# Patient Record
Sex: FEMALE | Race: WHITE | NOT HISPANIC OR LATINO | URBAN - METROPOLITAN AREA
[De-identification: names, ages, dates, MRNs, and addresses within clinical notes are randomized per-mention and may not be internally consistent; named-entity substitution may affect disease eponyms.]

---

## 2018-07-06 ENCOUNTER — OUTPATIENT (OUTPATIENT)
Dept: OUTPATIENT SERVICES | Facility: HOSPITAL | Age: 38
LOS: 1 days | End: 2018-07-06
Payer: COMMERCIAL

## 2018-07-06 DIAGNOSIS — O47.1 FALSE LABOR AT OR AFTER 37 COMPLETED WEEKS OF GESTATION: ICD-10-CM

## 2018-07-06 PROCEDURE — 59025 FETAL NON-STRESS TEST: CPT

## 2018-07-06 PROCEDURE — G0463: CPT

## 2018-07-06 NOTE — CONSULT NOTE ADULT - SUBJECTIVE AND OBJECTIVE BOX
Pt has an IUP of 37 weeks duration. She reports fluid per vagina today. Denies bleeding or abd pain. Pt determined not to have ROM. No fluid seen in vagina and amnisure was negative. Pt D/C to home. She will F/U with Dr. Buchanan or PRN. NST baseline 150/min, reactive without decelerations, without contractions

## 2018-07-13 ENCOUNTER — OUTPATIENT (OUTPATIENT)
Dept: OUTPATIENT SERVICES | Facility: HOSPITAL | Age: 38
LOS: 1 days | End: 2018-07-13
Payer: COMMERCIAL

## 2018-07-13 DIAGNOSIS — Z01.818 ENCOUNTER FOR OTHER PREPROCEDURAL EXAMINATION: ICD-10-CM

## 2018-07-13 LAB
ALBUMIN SERPL ELPH-MCNC: 3.6 G/DL — SIGNIFICANT CHANGE UP (ref 3.3–5.2)
ALP SERPL-CCNC: 82 U/L — SIGNIFICANT CHANGE UP (ref 40–120)
ALT FLD-CCNC: 21 U/L — SIGNIFICANT CHANGE UP
ANION GAP SERPL CALC-SCNC: 15 MMOL/L — SIGNIFICANT CHANGE UP (ref 5–17)
APTT BLD: 25.6 SEC — LOW (ref 27.5–37.4)
AST SERPL-CCNC: 24 U/L — SIGNIFICANT CHANGE UP
BILIRUB SERPL-MCNC: 0.2 MG/DL — LOW (ref 0.4–2)
BLD GP AB SCN SERPL QL: SIGNIFICANT CHANGE UP
BUN SERPL-MCNC: 7 MG/DL — LOW (ref 8–20)
CALCIUM SERPL-MCNC: 9.6 MG/DL — SIGNIFICANT CHANGE UP (ref 8.6–10.2)
CHLORIDE SERPL-SCNC: 102 MMOL/L — SIGNIFICANT CHANGE UP (ref 98–107)
CO2 SERPL-SCNC: 22 MMOL/L — SIGNIFICANT CHANGE UP (ref 22–29)
CREAT SERPL-MCNC: 0.43 MG/DL — LOW (ref 0.5–1.3)
GLUCOSE SERPL-MCNC: 81 MG/DL — SIGNIFICANT CHANGE UP (ref 70–115)
HCT VFR BLD CALC: 37.2 % — SIGNIFICANT CHANGE UP (ref 37–47)
HGB BLD-MCNC: 12.1 G/DL — SIGNIFICANT CHANGE UP (ref 12–16)
HIV 1 & 2 AB SERPL IA.RAPID: SIGNIFICANT CHANGE UP
INR BLD: 0.84 RATIO — LOW (ref 0.88–1.16)
MCHC RBC-ENTMCNC: 29 PG — SIGNIFICANT CHANGE UP (ref 27–31)
MCHC RBC-ENTMCNC: 32.5 G/DL — SIGNIFICANT CHANGE UP (ref 32–36)
MCV RBC AUTO: 89.2 FL — SIGNIFICANT CHANGE UP (ref 81–99)
PLATELET # BLD AUTO: 236 K/UL — SIGNIFICANT CHANGE UP (ref 150–400)
POTASSIUM SERPL-MCNC: 4.4 MMOL/L — SIGNIFICANT CHANGE UP (ref 3.5–5.3)
POTASSIUM SERPL-SCNC: 4.4 MMOL/L — SIGNIFICANT CHANGE UP (ref 3.5–5.3)
PROT SERPL-MCNC: 7 G/DL — SIGNIFICANT CHANGE UP (ref 6.6–8.7)
PROTHROM AB SERPL-ACNC: 9.2 SEC — LOW (ref 9.8–12.7)
RBC # BLD: 4.17 M/UL — LOW (ref 4.4–5.2)
RBC # FLD: 13.4 % — SIGNIFICANT CHANGE UP (ref 11–15.6)
SODIUM SERPL-SCNC: 139 MMOL/L — SIGNIFICANT CHANGE UP (ref 135–145)
TYPE + AB SCN PNL BLD: SIGNIFICANT CHANGE UP
WBC # BLD: 8 K/UL — SIGNIFICANT CHANGE UP (ref 4.8–10.8)
WBC # FLD AUTO: 8 K/UL — SIGNIFICANT CHANGE UP (ref 4.8–10.8)

## 2018-07-13 PROCEDURE — 86850 RBC ANTIBODY SCREEN: CPT

## 2018-07-13 PROCEDURE — 85610 PROTHROMBIN TIME: CPT

## 2018-07-13 PROCEDURE — 86901 BLOOD TYPING SEROLOGIC RH(D): CPT

## 2018-07-13 PROCEDURE — 86900 BLOOD TYPING SEROLOGIC ABO: CPT

## 2018-07-13 PROCEDURE — 86703 HIV-1/HIV-2 1 RESULT ANTBDY: CPT

## 2018-07-13 PROCEDURE — 36415 COLL VENOUS BLD VENIPUNCTURE: CPT

## 2018-07-13 PROCEDURE — 85027 COMPLETE CBC AUTOMATED: CPT

## 2018-07-13 PROCEDURE — 80053 COMPREHEN METABOLIC PANEL: CPT

## 2018-07-13 PROCEDURE — 85730 THROMBOPLASTIN TIME PARTIAL: CPT

## 2018-07-20 ENCOUNTER — INPATIENT (INPATIENT)
Facility: HOSPITAL | Age: 38
LOS: 1 days | Discharge: HOME | End: 2018-07-22
Payer: COMMERCIAL

## 2018-07-20 VITALS
HEART RATE: 99 BPM | OXYGEN SATURATION: 100 % | RESPIRATION RATE: 18 BRPM | SYSTOLIC BLOOD PRESSURE: 111 MMHG | DIASTOLIC BLOOD PRESSURE: 79 MMHG | TEMPERATURE: 98 F

## 2018-07-20 DIAGNOSIS — O47.1 FALSE LABOR AT OR AFTER 37 COMPLETED WEEKS OF GESTATION: ICD-10-CM

## 2018-07-20 LAB
BASE EXCESS BLDCOA CALC-SCNC: -0.5 MMOL/L — SIGNIFICANT CHANGE UP (ref -2–2)
BASE EXCESS BLDCOV CALC-SCNC: -0.5 MMOL/L — SIGNIFICANT CHANGE UP (ref -2–2)
BLD GP AB SCN SERPL QL: SIGNIFICANT CHANGE UP
GAS PNL BLDCOV: 7.36 — SIGNIFICANT CHANGE UP (ref 7.25–7.45)
HCO3 BLDCOA-SCNC: 22 MMOL/L — SIGNIFICANT CHANGE UP (ref 21–29)
HCO3 BLDCOV-SCNC: 23 MMOL/L — SIGNIFICANT CHANGE UP (ref 21–29)
PCO2 BLDCOA: 48.1 MMHG — SIGNIFICANT CHANGE UP (ref 32–68)
PCO2 BLDCOV: 44.9 MMHG — SIGNIFICANT CHANGE UP (ref 29–53)
PH BLDCOA: 7.34 — SIGNIFICANT CHANGE UP (ref 7.18–7.38)
PO2 BLDCOA: 16 MMHG — SIGNIFICANT CHANGE UP (ref 5.7–30.5)
PO2 BLDCOA: 22.6 MMHG — SIGNIFICANT CHANGE UP (ref 17–41)
SAO2 % BLDCOA: SIGNIFICANT CHANGE UP
SAO2 % BLDCOV: SIGNIFICANT CHANGE UP
TYPE + AB SCN PNL BLD: SIGNIFICANT CHANGE UP

## 2018-07-20 RX ORDER — LANOLIN
1 OINTMENT (GRAM) TOPICAL
Qty: 0 | Refills: 0 | Status: DISCONTINUED | OUTPATIENT
Start: 2018-07-20 | End: 2018-07-22

## 2018-07-20 RX ORDER — DOCUSATE SODIUM 100 MG
100 CAPSULE ORAL
Qty: 0 | Refills: 0 | Status: DISCONTINUED | OUTPATIENT
Start: 2018-07-20 | End: 2018-07-22

## 2018-07-20 RX ORDER — ACETAMINOPHEN 500 MG
650 TABLET ORAL EVERY 6 HOURS
Qty: 0 | Refills: 0 | Status: DISCONTINUED | OUTPATIENT
Start: 2018-07-20 | End: 2018-07-22

## 2018-07-20 RX ORDER — SODIUM CHLORIDE 9 MG/ML
1000 INJECTION, SOLUTION INTRAVENOUS
Qty: 0 | Refills: 0 | Status: DISCONTINUED | OUTPATIENT
Start: 2018-07-20 | End: 2018-07-22

## 2018-07-20 RX ORDER — NALOXONE HYDROCHLORIDE 4 MG/.1ML
0.1 SPRAY NASAL
Qty: 0 | Refills: 0 | Status: DISCONTINUED | OUTPATIENT
Start: 2018-07-20 | End: 2018-07-22

## 2018-07-20 RX ORDER — SODIUM CHLORIDE 9 MG/ML
1000 INJECTION, SOLUTION INTRAVENOUS
Qty: 0 | Refills: 0 | Status: DISCONTINUED | OUTPATIENT
Start: 2018-07-20 | End: 2018-07-20

## 2018-07-20 RX ORDER — OXYTOCIN 10 UNIT/ML
333.33 VIAL (ML) INJECTION
Qty: 20 | Refills: 0 | Status: COMPLETED | OUTPATIENT
Start: 2018-07-20

## 2018-07-20 RX ORDER — SODIUM CHLORIDE 9 MG/ML
1000 INJECTION, SOLUTION INTRAVENOUS ONCE
Qty: 0 | Refills: 0 | Status: COMPLETED | OUTPATIENT
Start: 2018-07-20 | End: 2018-07-20

## 2018-07-20 RX ORDER — ACETAMINOPHEN 500 MG
1000 TABLET ORAL ONCE
Qty: 0 | Refills: 0 | Status: DISCONTINUED | OUTPATIENT
Start: 2018-07-20 | End: 2018-07-22

## 2018-07-20 RX ORDER — SIMETHICONE 80 MG/1
80 TABLET, CHEWABLE ORAL EVERY 4 HOURS
Qty: 0 | Refills: 0 | Status: DISCONTINUED | OUTPATIENT
Start: 2018-07-20 | End: 2018-07-22

## 2018-07-20 RX ORDER — OXYCODONE AND ACETAMINOPHEN 5; 325 MG/1; MG/1
2 TABLET ORAL EVERY 6 HOURS
Qty: 0 | Refills: 0 | Status: DISCONTINUED | OUTPATIENT
Start: 2018-07-20 | End: 2018-07-22

## 2018-07-20 RX ORDER — DIPHENHYDRAMINE HCL 50 MG
50 CAPSULE ORAL EVERY 4 HOURS
Qty: 0 | Refills: 0 | Status: DISCONTINUED | OUTPATIENT
Start: 2018-07-20 | End: 2018-07-22

## 2018-07-20 RX ORDER — IBUPROFEN 200 MG
600 TABLET ORAL EVERY 6 HOURS
Qty: 0 | Refills: 0 | Status: DISCONTINUED | OUTPATIENT
Start: 2018-07-20 | End: 2018-07-22

## 2018-07-20 RX ORDER — OXYTOCIN 10 UNIT/ML
41.67 VIAL (ML) INJECTION
Qty: 20 | Refills: 0 | Status: DISCONTINUED | OUTPATIENT
Start: 2018-07-20 | End: 2018-07-22

## 2018-07-20 RX ORDER — OXYCODONE HYDROCHLORIDE 5 MG/1
5 TABLET ORAL
Qty: 0 | Refills: 0 | Status: DISCONTINUED | OUTPATIENT
Start: 2018-07-20 | End: 2018-07-22

## 2018-07-20 RX ORDER — OXYTOCIN 10 UNIT/ML
333.33 VIAL (ML) INJECTION
Qty: 20 | Refills: 0 | Status: COMPLETED | OUTPATIENT
Start: 2018-07-20 | End: 2018-07-20

## 2018-07-20 RX ORDER — ONDANSETRON 8 MG/1
4 TABLET, FILM COATED ORAL EVERY 6 HOURS
Qty: 0 | Refills: 0 | Status: DISCONTINUED | OUTPATIENT
Start: 2018-07-20 | End: 2018-07-22

## 2018-07-20 RX ORDER — OXYCODONE AND ACETAMINOPHEN 5; 325 MG/1; MG/1
1 TABLET ORAL
Qty: 0 | Refills: 0 | Status: DISCONTINUED | OUTPATIENT
Start: 2018-07-20 | End: 2018-07-22

## 2018-07-20 RX ORDER — CEFAZOLIN SODIUM 1 G
2000 VIAL (EA) INJECTION ONCE
Qty: 0 | Refills: 0 | Status: COMPLETED | OUTPATIENT
Start: 2018-07-20 | End: 2018-07-20

## 2018-07-20 RX ORDER — KETOROLAC TROMETHAMINE 30 MG/ML
30 SYRINGE (ML) INJECTION EVERY 6 HOURS
Qty: 0 | Refills: 0 | Status: DISCONTINUED | OUTPATIENT
Start: 2018-07-20 | End: 2018-07-21

## 2018-07-20 RX ORDER — OXYCODONE HYDROCHLORIDE 5 MG/1
10 TABLET ORAL
Qty: 0 | Refills: 0 | Status: DISCONTINUED | OUTPATIENT
Start: 2018-07-20 | End: 2018-07-22

## 2018-07-20 RX ORDER — TETANUS TOXOID, REDUCED DIPHTHERIA TOXOID AND ACELLULAR PERTUSSIS VACCINE, ADSORBED 5; 2.5; 8; 8; 2.5 [IU]/.5ML; [IU]/.5ML; UG/.5ML; UG/.5ML; UG/.5ML
0.5 SUSPENSION INTRAMUSCULAR ONCE
Qty: 0 | Refills: 0 | Status: DISCONTINUED | OUTPATIENT
Start: 2018-07-20 | End: 2018-07-22

## 2018-07-20 RX ORDER — CEFAZOLIN SODIUM 1 G
2000 VIAL (EA) INJECTION EVERY 8 HOURS
Qty: 0 | Refills: 0 | Status: COMPLETED | OUTPATIENT
Start: 2018-07-21 | End: 2018-07-21

## 2018-07-20 RX ORDER — KETOROLAC TROMETHAMINE 30 MG/ML
30 SYRINGE (ML) INJECTION ONCE
Qty: 0 | Refills: 0 | Status: DISCONTINUED | OUTPATIENT
Start: 2018-07-20 | End: 2018-07-22

## 2018-07-20 RX ORDER — GLYCERIN ADULT
1 SUPPOSITORY, RECTAL RECTAL AT BEDTIME
Qty: 0 | Refills: 0 | Status: DISCONTINUED | OUTPATIENT
Start: 2018-07-20 | End: 2018-07-22

## 2018-07-20 RX ORDER — SODIUM CHLORIDE 9 MG/ML
1500 INJECTION, SOLUTION INTRAVENOUS ONCE
Qty: 0 | Refills: 0 | Status: DISCONTINUED | OUTPATIENT
Start: 2018-07-20 | End: 2018-07-20

## 2018-07-20 RX ORDER — OXYTOCIN 10 UNIT/ML
333.33 VIAL (ML) INJECTION
Qty: 20 | Refills: 0 | Status: DISCONTINUED | OUTPATIENT
Start: 2018-07-20 | End: 2018-07-22

## 2018-07-20 RX ORDER — DIPHENHYDRAMINE HCL 50 MG
25 CAPSULE ORAL EVERY 6 HOURS
Qty: 0 | Refills: 0 | Status: DISCONTINUED | OUTPATIENT
Start: 2018-07-20 | End: 2018-07-22

## 2018-07-20 RX ORDER — CITRIC ACID/SODIUM CITRATE 300-500 MG
30 SOLUTION, ORAL ORAL ONCE
Qty: 0 | Refills: 0 | Status: DISCONTINUED | OUTPATIENT
Start: 2018-07-20 | End: 2018-07-20

## 2018-07-20 RX ORDER — FERROUS SULFATE 325(65) MG
325 TABLET ORAL DAILY
Qty: 0 | Refills: 0 | Status: DISCONTINUED | OUTPATIENT
Start: 2018-07-20 | End: 2018-07-22

## 2018-07-20 RX ADMIN — Medication 100 MILLIGRAM(S): at 17:00

## 2018-07-20 RX ADMIN — SODIUM CHLORIDE 125 MILLILITER(S): 9 INJECTION, SOLUTION INTRAVENOUS at 17:44

## 2018-07-20 RX ADMIN — SODIUM CHLORIDE 1000 MILLILITER(S): 9 INJECTION, SOLUTION INTRAVENOUS at 15:05

## 2018-07-20 RX ADMIN — Medication 1000 MILLIUNIT(S)/MIN: at 19:01

## 2018-07-21 LAB
BASOPHILS # BLD AUTO: 0 K/UL — SIGNIFICANT CHANGE UP (ref 0–0.2)
BASOPHILS NFR BLD AUTO: 0.2 % — SIGNIFICANT CHANGE UP (ref 0–2)
EOSINOPHIL # BLD AUTO: 0.1 K/UL — SIGNIFICANT CHANGE UP (ref 0–0.5)
EOSINOPHIL NFR BLD AUTO: 0.6 % — SIGNIFICANT CHANGE UP (ref 0–6)
HCT VFR BLD CALC: 35.1 % — LOW (ref 37–47)
HGB BLD-MCNC: 11.3 G/DL — LOW (ref 12–16)
LYMPHOCYTES # BLD AUTO: 1.8 K/UL — SIGNIFICANT CHANGE UP (ref 1–4.8)
LYMPHOCYTES # BLD AUTO: 16.3 % — LOW (ref 20–55)
MCHC RBC-ENTMCNC: 28.5 PG — SIGNIFICANT CHANGE UP (ref 27–31)
MCHC RBC-ENTMCNC: 32.2 G/DL — SIGNIFICANT CHANGE UP (ref 32–36)
MCV RBC AUTO: 88.4 FL — SIGNIFICANT CHANGE UP (ref 81–99)
MONOCYTES # BLD AUTO: 0.7 K/UL — SIGNIFICANT CHANGE UP (ref 0–0.8)
MONOCYTES NFR BLD AUTO: 6.3 % — SIGNIFICANT CHANGE UP (ref 3–10)
NEUTROPHILS # BLD AUTO: 8.5 K/UL — HIGH (ref 1.8–8)
NEUTROPHILS NFR BLD AUTO: 75.7 % — HIGH (ref 37–73)
PLATELET # BLD AUTO: 232 K/UL — SIGNIFICANT CHANGE UP (ref 150–400)
RBC # BLD: 3.97 M/UL — LOW (ref 4.4–5.2)
RBC # FLD: 13.7 % — SIGNIFICANT CHANGE UP (ref 11–15.6)
WBC # BLD: 11.2 K/UL — HIGH (ref 4.8–10.8)
WBC # FLD AUTO: 11.2 K/UL — HIGH (ref 4.8–10.8)

## 2018-07-21 RX ADMIN — Medication 600 MILLIGRAM(S): at 21:46

## 2018-07-21 RX ADMIN — Medication 1 TABLET(S): at 15:22

## 2018-07-21 RX ADMIN — Medication 30 MILLIGRAM(S): at 09:28

## 2018-07-21 RX ADMIN — Medication 30 MILLIGRAM(S): at 10:25

## 2018-07-21 RX ADMIN — Medication 30 MILLIGRAM(S): at 15:22

## 2018-07-21 RX ADMIN — Medication 100 MILLIGRAM(S): at 03:39

## 2018-07-21 RX ADMIN — SODIUM CHLORIDE 125 MILLILITER(S): 9 INJECTION, SOLUTION INTRAVENOUS at 03:40

## 2018-07-21 RX ADMIN — Medication 100 MILLIGRAM(S): at 10:39

## 2018-07-21 RX ADMIN — Medication 30 MILLIGRAM(S): at 15:37

## 2018-07-21 RX ADMIN — Medication 100 MILLIGRAM(S): at 18:03

## 2018-07-21 RX ADMIN — Medication 30 MILLIGRAM(S): at 03:28

## 2018-07-21 RX ADMIN — Medication 30 MILLIGRAM(S): at 03:43

## 2018-07-21 RX ADMIN — Medication 600 MILLIGRAM(S): at 22:46

## 2018-07-21 RX ADMIN — Medication 325 MILLIGRAM(S): at 15:22

## 2018-07-21 RX ADMIN — SIMETHICONE 80 MILLIGRAM(S): 80 TABLET, CHEWABLE ORAL at 18:03

## 2018-07-21 NOTE — PROGRESS NOTE ADULT - ASSESSMENT
Patient is a 36yo  now POD#1 s/p repeat  section      Stable  -catheter removed at 6:30, did not void yet,   -tolerating PO, bowel sounds +  -Advance care as tolerated   -Continue routine postpartum/postoperative care and education.

## 2018-07-22 VITALS
TEMPERATURE: 98 F | DIASTOLIC BLOOD PRESSURE: 84 MMHG | SYSTOLIC BLOOD PRESSURE: 122 MMHG | HEART RATE: 90 BPM | RESPIRATION RATE: 16 BRPM

## 2018-07-22 LAB — T PALLIDUM AB TITR SER: NEGATIVE — SIGNIFICANT CHANGE UP

## 2018-07-22 RX ORDER — TETANUS TOXOID, REDUCED DIPHTHERIA TOXOID AND ACELLULAR PERTUSSIS VACCINE, ADSORBED 5; 2.5; 8; 8; 2.5 [IU]/.5ML; [IU]/.5ML; UG/.5ML; UG/.5ML; UG/.5ML
0.5 SUSPENSION INTRAMUSCULAR ONCE
Qty: 0 | Refills: 0 | Status: COMPLETED | OUTPATIENT
Start: 2018-07-22 | End: 2018-07-22

## 2018-07-22 RX ORDER — IBUPROFEN 200 MG
1 TABLET ORAL
Qty: 0 | Refills: 0 | COMMUNITY
Start: 2018-07-22

## 2018-07-22 RX ORDER — TETANUS TOXOID, REDUCED DIPHTHERIA TOXOID AND ACELLULAR PERTUSSIS VACCINE, ADSORBED 5; 2.5; 8; 8; 2.5 [IU]/.5ML; [IU]/.5ML; UG/.5ML; UG/.5ML; UG/.5ML
0.5 SUSPENSION INTRAMUSCULAR ONCE
Qty: 0 | Refills: 0 | Status: DISCONTINUED | OUTPATIENT
Start: 2018-07-22 | End: 2018-07-22

## 2018-07-22 RX ADMIN — Medication 600 MILLIGRAM(S): at 04:27

## 2018-07-22 RX ADMIN — Medication 600 MILLIGRAM(S): at 10:51

## 2018-07-22 RX ADMIN — Medication 325 MILLIGRAM(S): at 10:49

## 2018-07-22 RX ADMIN — Medication 100 MILLIGRAM(S): at 02:21

## 2018-07-22 RX ADMIN — TETANUS TOXOID, REDUCED DIPHTHERIA TOXOID AND ACELLULAR PERTUSSIS VACCINE, ADSORBED 0.5 MILLILITER(S): 5; 2.5; 8; 8; 2.5 SUSPENSION INTRAMUSCULAR at 10:50

## 2018-07-22 RX ADMIN — SIMETHICONE 80 MILLIGRAM(S): 80 TABLET, CHEWABLE ORAL at 02:21

## 2018-07-22 RX ADMIN — Medication 600 MILLIGRAM(S): at 05:27

## 2018-07-22 RX ADMIN — Medication 1 TABLET(S): at 10:50

## 2018-07-22 NOTE — PROGRESS NOTE ADULT - SUBJECTIVE AND OBJECTIVE BOX
INTERVAL HPI/OVERNIGHT EVENTS:  37y Female s/p c section under spinal anesthesia with duramorph for post op analgesia on 7/20/18    Vital Signs Last 24 Hrs  T(C): 36.5 (21 Jul 2018 16:30), Max: 36.7 (21 Jul 2018 00:05)  T(F): 97.7 (21 Jul 2018 16:30), Max: 98.1 (21 Jul 2018 00:05)  HR: 99 (21 Jul 2018 16:30) (73 - 99)  BP: 132/70 (21 Jul 2018 16:30) (111/70 - 132/70)  BP(mean): --  RR: 16 (21 Jul 2018 16:30) (16 - 18)  SpO2: --    Patient seen, doing well, no anesthetic complications or complaints noted or reported.  Pain is controlled.
Patient is a 36yo  now POD#1 s/p repeat  section      S:    The patient has no complaints. Pain controlled with medication   She is ambulating, and tolerating PO.   + flatus/-BM     O:    T(C): 36.7 (18 @ 08:06), Max: 36.8 (18 @ 18:11)  HR: 89 (18 @ 08:06) (73 - 99)  BP: 113/79 (18 @ 08:06) (95/54 - 130/84)  RR: 16 (18 @ 08:06) (15 - 20)  SpO2: 99% (18 @ 20:10) (99% - 100%)    Breast: nontender, not engorged   Abdomen:  soft, appropriately tender, mildly distended, +bowel sounds.  Uterus:  Fundus firm below umbilicus  Incision:  Clean/dry/intact, the dressing was removed.  VE:  +lochia  Ext:  Non-tender, no edema                           11.3   11.2  )-----------( 232      ( 2018 06:59 )             35.1
Patient is a 37y s/p  post-operative day 2.    Subjective:  No acute events overnight. The patient is feeling well.   She is tolerating a diet and denies N/V.    Patient is having normal postpartum bleeding which is decreasing in amount.    She is breastfeeding and the baby is latching on.    Urinating appropriately.   -BM/+flatus.    Physical exam:    Vital Signs Last 24 Hrs  T(C): 36.6 (2018 23:55), Max: 36.7 (2018 08:06)  T(F): 97.9 (2018 23:55), Max: 98 (2018 08:06)  HR: 92 (2018 23:55) (89 - 99)  BP: 113/79 (2018 23:55) (113/79 - 132/70)  RR: 18 (2018 23:55) (16 - 18)      Heart: RRR  Lungs: CTABL  Breast: non tender, not engorged   Abdomen: Soft, nontender, no distension, firm uterine fundus, the incision is clean dry and intact  Ext: No DVT signs, warm extremities    LABS:                        11.3   11.2  )-----------( 232      ( 2018 06:59 )             35.1
Subjective:  The patient feels well.  She is ambulating without difficulty.  She is tolerating PO.  She is voiding.  She denies nausea and vomiting.  Her pain is controlled.  She reports normal postpartum bleeding      Physical exam:    Vital Signs Last 24 Hrs  T(C): 36.6 (21 Jul 2018 23:55), Max: 36.7 (21 Jul 2018 08:06)  T(F): 97.9 (21 Jul 2018 23:55), Max: 98 (21 Jul 2018 08:06)  HR: 92 (21 Jul 2018 23:55) (89 - 99)  BP: 113/79 (21 Jul 2018 23:55) (113/79 - 132/70)  BP(mean): --  RR: 18 (21 Jul 2018 23:55) (16 - 18)  SpO2: --    Gen: NAD   Breast feeding  Abdomen: Soft, nontender, no distension , firm uterine fundus at umbilicus.  Incision: Clean, dry and staples intact  Passing flatus and positive bowel movement  Pelvic: Normal lochia noted  Ext: No calf tenderness    LABS:                        11.3   11.2  )-----------( 232      ( 21 Jul 2018 06:59 )             35.1     blood type    A/P PO # 2   Doing well.  Patient requesting discharge if baby is okay to go home.  Patient will take Motrim for pain.  Follow up in office 1 wk   All questions answered.
Subjective:  The patient feels well.  She is ambulating without difficulty.  She is tolerating PO.  She is voiding.  She denies nausea and vomiting.  Her pain is controlled.  She reports normal postpartum bleeding      Physical exam:    Vital Signs Last 24 Hrs  T(C): 36.7 (21 Jul 2018 08:06), Max: 36.8 (20 Jul 2018 18:11)  T(F): 98 (21 Jul 2018 08:06), Max: 98.2 (20 Jul 2018 18:11)  HR: 89 (21 Jul 2018 08:06) (73 - 99)  BP: 113/79 (21 Jul 2018 08:06) (95/54 - 130/84)  BP(mean): --  RR: 16 (21 Jul 2018 08:06) (15 - 20)  SpO2: 99% (20 Jul 2018 20:10) (99% - 100%)    Gen: NAD  Breast feeding  Abdomen: Soft, nontender, no distension , firm uterine fundus at umbilicus.  Incision: Clean, dry, and intact  staples  Pelvic: Normal lochia noted   Passing flatus  Ext: No calf tenderness    LABS:                        11.3   11.2  )-----------( 232      ( 21 Jul 2018 06:59 )             35.1     Antibody Screen: NEG (07-20 @ 17:54)  blood type    A/P PO # 2   Doing well.  Routine PO care.

## 2018-07-22 NOTE — PROGRESS NOTE ADULT - ASSESSMENT
Section Day: 2  Patient is feeling well overall.     Continue the current pain medication.   Encourage ambulation and regular diet.   Continue DVT ppx: SCDs.   Plan to discharge on day 3 or 4 according to the normal criteria.

## 2018-07-22 NOTE — DISCHARGE NOTE OB - CARE PROVIDER_API CALL
Miesha Buchanan), Obstetrics and Gynecology  [address] [place] [address]  Phone: [phone]  Fax: [phone]

## 2018-07-22 NOTE — DISCHARGE NOTE OB - CARE PLAN
Principal Discharge DX:	 delivery delivered  Goal:	rapid recovery  Assessment and plan of treatment:	patient recovery uneventful.

## 2018-07-22 NOTE — DISCHARGE NOTE OB - PATIENT PORTAL LINK FT
You can access the [employer] Patient Portal, offered by [facility], by registering with the following website: [email or web address]

## 2018-07-23 PROCEDURE — 88302 TISSUE EXAM BY PATHOLOGIST: CPT

## 2018-07-23 PROCEDURE — 59050 FETAL MONITOR W/REPORT: CPT

## 2018-07-23 PROCEDURE — G0463: CPT

## 2018-07-23 PROCEDURE — 90715 TDAP VACCINE 7 YRS/> IM: CPT

## 2018-07-23 PROCEDURE — 88302 TISSUE EXAM BY PATHOLOGIST: CPT | Mod: 26

## 2018-07-23 PROCEDURE — 86850 RBC ANTIBODY SCREEN: CPT

## 2018-07-23 PROCEDURE — 59025 FETAL NON-STRESS TEST: CPT

## 2018-07-23 PROCEDURE — 85027 COMPLETE CBC AUTOMATED: CPT

## 2018-07-23 PROCEDURE — 86901 BLOOD TYPING SEROLOGIC RH(D): CPT

## 2018-07-23 PROCEDURE — 86780 TREPONEMA PALLIDUM: CPT

## 2018-07-23 PROCEDURE — 82803 BLOOD GASES ANY COMBINATION: CPT

## 2018-07-23 PROCEDURE — 86900 BLOOD TYPING SEROLOGIC ABO: CPT

## 2018-07-23 PROCEDURE — 36415 COLL VENOUS BLD VENIPUNCTURE: CPT

## 2018-07-26 LAB — SURGICAL PATHOLOGY FINAL REPORT - CH: SIGNIFICANT CHANGE UP

## 2022-03-09 NOTE — DISCHARGE NOTE OB - FUNCTIONAL SCREEN CURRENT LEVEL: DRESSING, MLM
1. Refresh Liquigel is an over-the-counter artificial tear.  One drop in each eye at bedtime and 1- 2 times daily.   2. Flaxseed 1000 mg 2 times daily    (0) independent